# Patient Record
Sex: MALE | Race: WHITE | NOT HISPANIC OR LATINO | Employment: STUDENT | URBAN - METROPOLITAN AREA
[De-identification: names, ages, dates, MRNs, and addresses within clinical notes are randomized per-mention and may not be internally consistent; named-entity substitution may affect disease eponyms.]

---
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
## 2025-01-09 ENCOUNTER — APPOINTMENT (OUTPATIENT)
Dept: PRIMARY CARE | Facility: CLINIC | Age: 20
End: 2025-01-09
Payer: COMMERCIAL

## 2025-01-09 VITALS
DIASTOLIC BLOOD PRESSURE: 76 MMHG | TEMPERATURE: 97.2 F | HEIGHT: 67 IN | WEIGHT: 113 LBS | BODY MASS INDEX: 17.74 KG/M2 | SYSTOLIC BLOOD PRESSURE: 100 MMHG | HEART RATE: 82 BPM | OXYGEN SATURATION: 99 % | RESPIRATION RATE: 14 BRPM

## 2025-01-09 DIAGNOSIS — F90.2 ADHD (ATTENTION DEFICIT HYPERACTIVITY DISORDER), COMBINED TYPE: ICD-10-CM

## 2025-01-09 DIAGNOSIS — Z00.00 ENCOUNTER FOR GENERAL ADULT MEDICAL EXAMINATION WITHOUT ABNORMAL FINDINGS: ICD-10-CM

## 2025-01-09 DIAGNOSIS — L21.9 SEBORRHEIC DERMATITIS: ICD-10-CM

## 2025-01-09 DIAGNOSIS — J06.9 ACUTE URI: Primary | ICD-10-CM

## 2025-01-09 DIAGNOSIS — F84.5 ASPERGER'S DISORDER: ICD-10-CM

## 2025-01-09 DIAGNOSIS — L70.9 ACNE, UNSPECIFIED ACNE TYPE: ICD-10-CM

## 2025-01-09 LAB
FLUAV RNA RESP QL NAA+PROBE: NOT DETECTED
FLUBV RNA RESP QL NAA+PROBE: NOT DETECTED
POC RAPID STREP: NEGATIVE
RSV RNA RESP QL NAA+PROBE: NOT DETECTED
SARS-COV-2 RNA RESP QL NAA+PROBE: NOT DETECTED

## 2025-01-09 PROCEDURE — 87637 SARSCOV2&INF A&B&RSV AMP PRB: CPT

## 2025-01-09 PROCEDURE — 1036F TOBACCO NON-USER: CPT | Performed by: STUDENT IN AN ORGANIZED HEALTH CARE EDUCATION/TRAINING PROGRAM

## 2025-01-09 PROCEDURE — 99395 PREV VISIT EST AGE 18-39: CPT | Performed by: STUDENT IN AN ORGANIZED HEALTH CARE EDUCATION/TRAINING PROGRAM

## 2025-01-09 PROCEDURE — 87880 STREP A ASSAY W/OPTIC: CPT | Performed by: STUDENT IN AN ORGANIZED HEALTH CARE EDUCATION/TRAINING PROGRAM

## 2025-01-09 PROCEDURE — 3008F BODY MASS INDEX DOCD: CPT | Performed by: STUDENT IN AN ORGANIZED HEALTH CARE EDUCATION/TRAINING PROGRAM

## 2025-01-09 RX ORDER — AMOXICILLIN AND CLAVULANATE POTASSIUM 875; 125 MG/1; MG/1
875 TABLET, FILM COATED ORAL 2 TIMES DAILY
Qty: 14 TABLET | Refills: 0 | Status: SHIPPED | OUTPATIENT
Start: 2025-01-09 | End: 2025-01-16

## 2025-01-09 ASSESSMENT — ENCOUNTER SYMPTOMS
DIAPHORESIS: 0
MYALGIAS: 0
DIARRHEA: 0
DYSURIA: 0
CHILLS: 1
SHORTNESS OF BREATH: 0
RHINORRHEA: 0
LIGHT-HEADEDNESS: 0
SORE THROAT: 1
WHEEZING: 0
SINUS PRESSURE: 0
SINUS PAIN: 0
DIZZINESS: 0
FEVER: 0
VOMITING: 0
NAUSEA: 0
COUGH: 1

## 2025-01-09 ASSESSMENT — PATIENT HEALTH QUESTIONNAIRE - PHQ9
2. FEELING DOWN, DEPRESSED OR HOPELESS: NOT AT ALL
SUM OF ALL RESPONSES TO PHQ9 QUESTIONS 1 AND 2: 0
1. LITTLE INTEREST OR PLEASURE IN DOING THINGS: NOT AT ALL

## 2025-01-09 NOTE — PROGRESS NOTES
"Subjective   Patient ID: Kirk Ladd is a 19 y.o. male who presents for Annual Exam and Sinusitis (X3-4 days pt has c.o sore throat, cough, and congestion. Pts sister tested positive for strep. ).  HPI  Got sick assisted through trip to [place]. Some mild constipation initially but this resolved.     Main symptoms are sore throat, cough, nasal congestion intermittently but better as of recent. Some lethargy as well. Intermittent chills. Symptoms started about 4 days ago. Taking tylenol, dayquil. Appetite is off, feels hungry but gets early satiety. Sore throat is probably the most persistent symptom.     Review of Systems   Constitutional:  Positive for chills (intermittently, resolved). Negative for diaphoresis and fever.   HENT:  Positive for sore throat. Negative for ear discharge, ear pain, hearing loss, postnasal drip, rhinorrhea, sinus pressure and sinus pain.    Eyes:  Negative for visual disturbance.   Respiratory:  Positive for cough (dry). Negative for shortness of breath and wheezing.    Cardiovascular:  Negative for chest pain.   Gastrointestinal:  Negative for diarrhea, nausea and vomiting.   Endocrine: Negative for cold intolerance and heat intolerance.   Genitourinary:  Negative for dysuria, scrotal swelling and testicular pain.   Musculoskeletal:  Negative for myalgias.   Skin:  Negative for rash.   Neurological:  Negative for dizziness, syncope and light-headedness.       /76   Pulse 82   Temp 36.2 °C (97.2 °F)   Resp 14   Ht 1.689 m (5' 6.5\")   Wt 51.3 kg (113 lb)   SpO2 99%   BMI 17.97 kg/m²     Objective   Physical Exam  Vitals reviewed.   Constitutional:       General: He is not in acute distress.     Appearance: Normal appearance.   HENT:      Head: Normocephalic.      Right Ear: Tympanic membrane, ear canal and external ear normal. There is no impacted cerumen.      Left Ear: Tympanic membrane, ear canal and external ear normal. There is no impacted cerumen.      " Mouth/Throat:      Mouth: Mucous membranes are moist.      Pharynx: Oropharynx is clear. No oropharyngeal exudate or posterior oropharyngeal erythema.   Cardiovascular:      Rate and Rhythm: Normal rate and regular rhythm.   Pulmonary:      Effort: Pulmonary effort is normal. No respiratory distress.      Breath sounds: Normal breath sounds.   Abdominal:      General: Bowel sounds are normal. There is no distension.      Palpations: Abdomen is soft. There is no mass.      Tenderness: There is no abdominal tenderness. There is no guarding or rebound.   Musculoskeletal:         General: No deformity.      Cervical back: Neck supple. No tenderness.   Lymphadenopathy:      Cervical: No cervical adenopathy.   Skin:     Coloration: Skin is not jaundiced.   Neurological:      Mental Status: He is alert.         Assessment/Plan   Problem List Items Addressed This Visit       ADHD (attention deficit hyperactivity disorder), combined type    Asperger's disorder    Encounter for general adult medical examination without abnormal findings    Acne    Seborrheic dermatitis    Acute URI - Primary    Relevant Medications    amoxicillin-pot clavulanate (Augmentin) 875-125 mg tablet    Other Relevant Orders    Influenza A, and B PCR    Sars-CoV-2 PCR    RSV PCR    POCT Rapid Strep A manually resulted (Completed)         URI  - In office strep test negative  - Flu COVID RSV ordered  - Plenty of fluids and rest  - Salt water gargles to help with sore throat  - Consider Flonase if nasal congestion but this is not present.  - Monitor Tylenol intake with DayQuil  - If not much better or worsening symptoms start Augmentin.  Follow-up if not much better.  Let us know symptoms change or worsen significantly.    Asperger's disorder  ADHD  Gender identity  -Previously has been on Vyvanse 30 mg a day which helps  -CSA 7/11/2024  - UDS ordered 7/11/2024  -MERRILL-7 is 2, PHQ-9 is 5   - With above symptoms unsure of the direction to next take-unsure  if stimulant therapy or SSRI or other medication would be best for him.  Refer to psychiatry and psychology to establish care at .  - Fasting labs ordered  -1/25, did not see dermatology yet.     Seborrheic dermatitis  -Sees dermatology  -Management per derm  -Asymptomatic now, controlled with ketoconazole and metronidazole gel.      Acne  -On doxy, management per derm     Health Maintenance  -Prostate Cancer Screening: Age 50  -Vaccinations: Up-to-date flu vaccine, recommend COVID booster.  Up-to-date Tdap. Reports UTD meningitis vaccination.   -Lung Cancer Screening: Not indicated  -AAA Screening:Not indicated  -Colonoscopy:Age 45  -Screen for HIV/Hep C: Ordered.    CPE completed.  Advised to keep a heart healthy, low fat  diet with fruits and veggies like Mediterranean diet.  Advised on the importance of exercise and maintaining 150 minutes of exercise per week (30 minutes per day 5 days a week).  Advised on regular eye and dental visits.  Discussed age appropriate cancer screening, immunizations and recommendations given.  Discussed avoiding illicit drugs and tobacco and alcohol.   Advised to wear seat belt.       At [employer], studying women and gender studies and is sophomore. At home with stepmom, dad, brother and sister. Planning to transfer to [employer] after one more semester.      CPE 1 year  Follow-up sooner if needed.

## 2025-03-17 ENCOUNTER — APPOINTMENT (OUTPATIENT)
Dept: BEHAVIORAL HEALTH | Facility: CLINIC | Age: 20
End: 2025-03-17
Payer: COMMERCIAL

## 2025-03-17 DIAGNOSIS — F66 GENDER IDENTITY UNCERTAINTY: ICD-10-CM

## 2025-03-17 DIAGNOSIS — F32.A DEPRESSION, UNSPECIFIED DEPRESSION TYPE: ICD-10-CM

## 2025-03-17 DIAGNOSIS — F90.2 ADHD (ATTENTION DEFICIT HYPERACTIVITY DISORDER), COMBINED TYPE: ICD-10-CM

## 2025-03-17 DIAGNOSIS — F84.5 ASPERGER'S DISORDER: ICD-10-CM

## 2025-03-17 PROCEDURE — 99203 OFFICE O/P NEW LOW 30 MIN: CPT | Performed by: NURSE PRACTITIONER

## 2025-03-17 ASSESSMENT — ANXIETY QUESTIONNAIRES
7. FEELING AFRAID AS IF SOMETHING AWFUL MIGHT HAPPEN: NOT AT ALL
2. NOT BEING ABLE TO STOP OR CONTROL WORRYING: NOT AT ALL
4. TROUBLE RELAXING: NOT AT ALL
3. WORRYING TOO MUCH ABOUT DIFFERENT THINGS: NOT AT ALL
6. BECOMING EASILY ANNOYED OR IRRITABLE: NOT AT ALL
5. BEING SO RESTLESS THAT IT IS HARD TO SIT STILL: SEVERAL DAYS
GAD7 TOTAL SCORE: 2
1. FEELING NERVOUS, ANXIOUS, OR ON EDGE: SEVERAL DAYS
IF YOU CHECKED OFF ANY PROBLEMS ON THIS QUESTIONNAIRE, HOW DIFFICULT HAVE THESE PROBLEMS MADE IT FOR YOU TO DO YOUR WORK, TAKE CARE OF THINGS AT HOME, OR GET ALONG WITH OTHER PEOPLE: SOMEWHAT DIFFICULT

## 2025-03-17 ASSESSMENT — PATIENT HEALTH QUESTIONNAIRE - PHQ9
SUM OF ALL RESPONSES TO PHQ QUESTIONS 1-9: 6
10. IF YOU CHECKED OFF ANY PROBLEMS, HOW DIFFICULT HAVE THESE PROBLEMS MADE IT FOR YOU TO DO YOUR WORK, TAKE CARE OF THINGS AT HOME, OR GET ALONG WITH OTHER PEOPLE: SOMEWHAT DIFFICULT
8. MOVING OR SPEAKING SO SLOWLY THAT OTHER PEOPLE COULD HAVE NOTICED. OR THE OPPOSITE, BEING SO FIGETY OR RESTLESS THAT YOU HAVE BEEN MOVING AROUND A LOT MORE THAN USUAL: SEVERAL DAYS
6. FEELING BAD ABOUT YOURSELF - OR THAT YOU ARE A FAILURE OR HAVE LET YOURSELF OR YOUR FAMILY DOWN: NOT AT ALL
4. FEELING TIRED OR HAVING LITTLE ENERGY: NOT AT ALL
2. FEELING DOWN, DEPRESSED OR HOPELESS: SEVERAL DAYS
SUM OF ALL RESPONSES TO PHQ9 QUESTIONS 1 & 2: 2
5. POOR APPETITE OR OVEREATING: NOT AT ALL
1. LITTLE INTEREST OR PLEASURE IN DOING THINGS: SEVERAL DAYS
9. THOUGHTS THAT YOU WOULD BE BETTER OFF DEAD, OR OF HURTING YOURSELF: NOT AT ALL
7. TROUBLE CONCENTRATING ON THINGS, SUCH AS READING THE NEWSPAPER OR WATCHING TELEVISION: NEARLY EVERY DAY
3. TROUBLE FALLING OR STAYING ASLEEP: NOT AT ALL

## 2025-03-17 NOTE — PROGRESS NOTES
"Time In: 1333  Time Out: 1400    An interactive audio and video telecommunication system which permits real time communications between the patient (at the originating site) and provider (at the distant site) was utilized to provide this telehealth service.   Verbal consent was requested and obtained from Kirk Ladd on this date, 03/17/25 for a telehealth visit and the patient's location was confirmed at the time of the visit.    The patient verified his date of birth, address and phone number.     Subjective   Kirk Ladd, a 19 y.o. male, presenting to Psychiatry for evaluation and medication management     Preferred Name:  Kirk    Reason for visit:  Medication management    Chief Complaint:  \"A lot of thing.\"     Current Mood:  \"pretty good.\"     HPI  Kirk Ladd is a 19 y.o. male patient with a chief complaint of medication management presenting to outpatient treatment for a scheduled psych outpatient psychiatric evaluation.    The patient reports, \"A lot of thing.\" He reports that he wants to \"figure out me a little bit more.\" He reports \"of been questioning for about 4 years now.\" He reports that he wants to figure that out more. He reports that he was previously in therapy but is not right now. He reports that he wants to start estrogen. He reports that he wants to talk it over with his family. He reports that they are supportive. He reports that his original thought for pursuing mental health treatment to figure out what he needs to help him not fall back into depression anxiety. He acknowledges that his symptoms of depression and anxiety have been better managed well recently. He reports that he is transferring from [employer] to [employer] and is looking forward to this. He plans to live on campus. He is majoring in computer science.     The patient first started experiencing mental health symptoms around his senior year of high school. He reports that this was when he lost all " motivation. He reports that he was still passing his classes but wasn't joining activities or working as hard to impress his teachers. He reports that he is procrastinating but does get his work done. He reports that he has had a couple therapists in the past, starting a couple years ago. He reports that he has not been on any medications except Vyvanse for ADHD. He denies any prior suicide attempts, suicidal ideation, psychiatric hospitalizations, self harm, or violence.          ADULT Psychiatric Review of Symptoms:  Anxiety: MERRILL       MERRILL: excessive anxiety/worry, difficulty concentrating, restlessness, and See HPI    OCD: Denies    Panic: Denies    PTSD: Denies    ADHD: See HPI    Depression: Depressed mood, anhedonia, concentration poor, loss of interest, lack of motivation, and See HPI    Delirium: Denies    Psychosis: Denies    Ally: Denies    Safety Issues: Denies         HISTORY    Past Psychiatric History  Current diagnosis: depression, anxiety  Current therapist: denies  Outpatient treatment history: ADHD treatment in childhood, therapy in the past  Inpatient treatment history: denies  Substance abuse treatment: denies  History of suicide attempts: denies  History of self-harm: denies  History of violence: denies  Current psychiatric medications: denies  Past psychiatric medications: Vyvanse    Addiction/Substance Use  Alcohol use: denies  Nicotine use: denies  Drug use:    Opioids: denies   Cocaine: denies   Cannabis/[place]: denies   Methamphetamines: denies   Hallucinogens:  denies  Caffeine use: denies      Social/Developmental History  Occupation: , courtesy and office workat [employer]  Relationship status: never    Household members: lives between mother's house and father's house   Children: denies  Siblings: 4, 1 brother, 1 half sister, 1 step brother and 1 stepsister  Family relationships: father, brother, and sister  Stressors: work and school  Grade/school: finished high  "school, at [employer] but transferring to [employer] next semester, with major in computer science  Learning problems/IEP: ADHD  Abuse/neglect history: denies   history: denies  Legal history: denies  Employment history: part time  Interests/strengths: video games, time with family, reading  Guns in the home: denies      Family Psychiatric History  Mental Health: denies  Substance Abuse: denies  Suicide: denies       Medical History  -PCP: Zachary Caicedo, DO  -TBI/head trauma/LOC/seizure hx: denies  -Medical: denies  -Surgical: denies       Falls  History of falls: No  Have you fallen in the past 12 months: No      High Blood pressure  No      Depression Screening:   PHQ9 score: 6  Plan: Medication, Therapy, and Follow up with this writer      Anxiety Screening:  MERRILL-7 Score: 2  Plan: Medication, Therapy, and Follow up with this writer    Patient Health Questionnaire-9 Score: 6 (3/17/2025  1:46 PM)  MERRILL-7 Total Score: 2 (3/17/2025  1:48 PM)       Record Review: brief      Mental Status Exam:  General appearance: Appears state age, appropriate eye contact, adequate grooming and hygiene  Attitude: Calm, cooperative, and engaged in conversation.  Behavior: Appropriate eye contact.   Motor Activity: No psychomotor agitation or retardation. No abnormal movements, tremors or tics. No evidence of extrapyramidal symptoms or tardive dyskinesia.  Speech: Regular rate, rhythm, volume. Spontaneous, no pressured speech.  Mood: \"pretty good.\"   Affect: Appropriate, full range, mood congruent.  Thought Process: Linear, logical, and goal-directed. No loose associations or gross thought disorganization.  Thought Content: Denied current suicidal ideation or thoughts of harm to self, denied homicidal ideation or thoughts of harm to others. No delusional thinking elicited. No perseverations or obsessions identified.   Perception: Did not endorse auditory or visual hallucinations, did not appear to be responding to " hallucinatory stimuli.   Cognition: Alert, oriented x3. Preserved attention span and concentration, recent and remote memory. Adequate fund of knowledge. No deficits in language.   Insight: Good, in regards to understanding mental health condition  Judgement: Good           Review of Systems  Eyes  no discharge, no pain  Ears, Nose, Mouth, Throat  no pain, no rhinorrhea, no dysphagia  Resp  no dyspnea, no cough  GI  no nausea, vomiting, diarrhea    no urgency, no dysuria  Muskuloskeletal  no pain, no weakness  Integumentary  no rash  Endocrine   no polyurea  Hematologic  no bruising, no bleeding problems  CV  no palpitations, no pain  Pulm  no chest pain  Neuro  no weakness, no coordination problems, no dizziness  Constitutional  energy, appetite normal  Psychiatric  see psychiatric review of systems and HPI      OARRS:  LAURA Keating-CNP on 3/17/2025  1:32 PM  I have personally reviewed the OARRS report for Kirk Ladd. I have considered the risks of abuse, dependence, addiction and diversion        Vitals:  There were no vitals filed for this visit.        Current Medications  Current Outpatient Medications on File Prior to Visit   Medication Sig Dispense Refill    cetirizine (ZyrTEC) 10 mg tablet Take 1 tablet (10 mg) by mouth once daily.      doxycycline (Adoxa) 100 mg tablet Take 1 tablet (100 mg) by mouth once daily.      ketoconazole (NIZOral) 2 % shampoo Apply topically once daily.      metroNIDAZOLE (Metrogel) 1 % gel Apply topically once daily.      [DISCONTINUED] Vyvanse 30 mg capsule Take 1 capsule (30 mg) by mouth once daily in the morning. (Patient not taking: Reported on 3/17/2025) 30 capsule 0     No current facility-administered medications on file prior to visit.          MEDICAL-DECISION MAKING  Low: 2 or more self-limited or minor problems with Low risk of morbidity from additional diagnostic testing or treatment         IMPRESSION  This is a 19-year-old male who presents for  psychiatric evaluation for depression and anxiety as well as gender identity uncertainty.  The patient is endorsing mild symptoms of depression currently.  He is also reporting mild symptoms of anxiety.  However, at this time, he feels that they are manageable and that medication are not required.  He denies any current suicidal ideation and denies any history of suicidal thinking.  He endorses the desire to explore the possibility of starting estrogen.  He is not currently in therapy but is open to a referral to the behavioral health navigator for therapy options.  He is help seeking and future oriented.  He is agreeable to checking in with this writer on 5/12/2025 to evaluate symptoms and whether medications are needed.     Diagnoses  Problem List Items Addressed This Visit       ADHD (attention deficit hyperactivity disorder), combined type    Asperger's disorder    Gender identity uncertainty    Relevant Orders    Referral to Behavioral Health Navigator    Follow Up In Psychiatry     Other Visit Diagnoses       Depression, unspecified depression type        Relevant Orders    Referral to Behavioral Health Navigator    Follow Up In Psychiatry             SI/HI ASSESSMENT  -Risk Assessment: Kirk Ladd is currently a low acute risk of suicide and self-harm due to no past suicide attempt(s) and not currently endorsing thoughts of suicide. Kirk Ladd is currently a low acute risk of violence and harm to others due to no past history of violence and not currently threatening others.  -Suicidal Risk Factors: , male, and unmarried/single  -Violence Risk Factors: male  -Protective Factors: strong coping skills, sense of responsibility towards family, social support/connectedness, positive family relationships, hopefulness/future orientation, and employment  -Plan to Reduce Risk: Establish medication regimen, outpatient follow-up care, and increase coping skills .         [place] suicide severity  rating scale  Suicidal and self-injurious behavior in the past 3 months: denies    Suicidal and self-injurious behavior in lifetime: Denies    Suicidal ideation (check most severe in past month): Denies    Activating events (recent):  gender identity uncertainty     Treatment history: Previous psychiatric diagnosis and treatment, not receiving treatment, and no current medications/treatment    Other risk factors: , Male, No children, and Not     Clinical status (recent): Denies    Protective factors (recent): Identifies reasons for living, responsibility to family or others, living with family/supports, supportive social network or family, and engaged in work or school    Other protective factors: Age and Employed/in school    Describe any suicidal, self-injurious, or aggressive behavior (include dates): denies        PLAN  -No medications indicated at this time  -Follow-up with this provider on 5/12/2025  -Referral to behavioral health navigator for therapy options  -Risks/benefits/assessment of medication interventions discussed with pt; pt agreeable to plan. Will continue to monitor for symptoms management and side effects and adjust plan as needed.  -Motivational interviewing to increase coping skills/behavior regulation.  -Safety plan reviewed.  -Call  Psychiatry at [phone] or communicate through [employer] with issues .   -For [facility] residents, [facility] is a 24/7 hotline you can call for assistance at [phone]) [phone]. Please call 769 or go to your closest Emergency Room if you feel worse. This includes thoughts of hurting yourself or anyone else, or having other troubles such as hearing voices, seeing visions, or having new and scary thoughts about the people around you.      Review with patient: Treatment plan reviewed with the patient.  Medication risks/benefit reviewed with the patient      Time Spent:    Prep time: 2 minutes  Direct patient time: 27 minutes  Documentation  time: 7 minutes  Total time: 36 minutes    Silvia Anaya, APRN-CNP

## 2025-03-25 ENCOUNTER — TELEPHONE (OUTPATIENT)
Dept: OTHER | Age: 20
End: 2025-03-25
Payer: COMMERCIAL

## 2025-03-25 NOTE — TELEPHONE ENCOUNTER
Navigation services: attempted to contact patient to provide counseling resources on 3/18/25 (left VM), 3/25/25 (mailbox full).

## 2025-03-31 ENCOUNTER — TELEPHONE (OUTPATIENT)
Dept: OTHER | Age: 20
End: 2025-03-31

## 2025-05-12 ENCOUNTER — APPOINTMENT (OUTPATIENT)
Dept: BEHAVIORAL HEALTH | Facility: CLINIC | Age: 20
End: 2025-05-12
Payer: COMMERCIAL

## 2025-05-12 DIAGNOSIS — F32.A DEPRESSION, UNSPECIFIED DEPRESSION TYPE: ICD-10-CM

## 2025-05-12 DIAGNOSIS — F66 GENDER IDENTITY UNCERTAINTY: ICD-10-CM

## 2025-05-12 DIAGNOSIS — F41.9 ANXIETY: ICD-10-CM

## 2025-05-12 PROCEDURE — 99214 OFFICE O/P EST MOD 30 MIN: CPT | Performed by: NURSE PRACTITIONER

## 2025-05-12 RX ORDER — HYDROXYZINE HYDROCHLORIDE 10 MG/1
10 TABLET, FILM COATED ORAL DAILY PRN
Qty: 30 TABLET | Refills: 0 | Status: SHIPPED | OUTPATIENT
Start: 2025-05-12 | End: 2025-06-11

## 2025-05-12 NOTE — PROGRESS NOTES
"Time in: 1001  Time out: 1012    An interactive audio and video telecommunication system which permits real time communications between the patient (at the originating site) and provider (at the distant site) was utilized to provide this telehealth service.   Verbal consent was requested and obtained from Kirk Ladd on this date, 05/12/25 for a telehealth visit and the patient's location was confirmed at the time of the visit.     The patient verified date of birth, address and phone number.     Preferred Name:  Kirk    Chief Complaint  \"Things are good. A lot has changed and a lot has stayed the same.\"       History of Present Illness:  Kirk Ladd is a 19 y.o. male patient with a chief complaint of medication management presenting to outpatient treatment for a scheduled psych outpatient psychiatric follow-up.    The patient reports, \"Things are good. A lot has changed and a lot has stayed the same.\" He reports that he moved to a close part of [place] with his siblings and dad. He reports that he is done with school for the semester and will be transferring to [employer] and will live on campus. He reports that his depression symptoms are not too prominent and mainly situationally basis. He reports that his anxiety is more prominent and always feels present. He reports that the anxiety is manageable most of the time. He reports that he will \"get randomly nervous\" before events and \"I tend to overthink things.\" He denies and suicidal ideation. He reports that his sleep has been \"rougher than usual\" due to allergies. He reports that he did look in to the therapy option and \"we are still figuring things out.\"         Falls  History of falls: No  Have you fallen in the past 12 months: No        High Blood pressure  No        Depression Screening:   Manageable   Plan: Medication, Therapy, and Follow up with this writer        Anxiety Screening:  Increase in situational anxiety  Plan: Medication, " Culture Follow-up "Therapy, and Follow up with this writer      Record Review: brief      Mental Status Exam:  General appearance: Appears state age, appropriate eye contact, adequate grooming and hygiene  Attitude: Calm, cooperative, and engaged in conversation.  Behavior: Appropriate eye contact.   Motor Activity: No psychomotor agitation or retardation. No abnormal movements, tremors or tics. No evidence of extrapyramidal symptoms or tardive dyskinesia.  Speech: Regular rate, rhythm, volume. Spontaneous, no pressured speech.  Mood: \"Good.\"   Affect: Appropriate, full range, mood congruent.  Thought Process: Linear, logical, and goal-directed. No loose associations or gross thought disorganization.  Thought Content: Denied current suicidal ideation or thoughts of harm to self, denied homicidal ideation or thoughts of harm to others. No delusional thinking elicited. No perseverations or obsessions identified.   Perception: Did not endorse auditory or visual hallucinations, did not appear to be responding to hallucinatory stimuli.   Cognition: Alert, oriented x3. Preserved attention span and concentration, recent and remote memory. Adequate fund of knowledge. No deficits in language.   Insight: Good, in regards to understanding mental health condition  Judgement: Good        Review of Systems  Eyes  no discharge, no pain  Ears, Nose, Mouth, Throat  no pain, no rhinorrhea, no dysphagia  Resp  no dyspnea, no cough  GI  no nausea, vomiting, diarrhea    no urgency, no dysuria  Muskuloskeletal  no pain, no weakness  Integumentary  no rash  Endocrine   no polyurea  Hematologic  no bruising, no bleeding problems  CV  no palpitations, no pain  Pulm  no chest pain  Neuro  no weakness, no coordination problems, no dizziness  Constitutional  energy, appetite normal  Psychiatric  see psychiatric review of systems and HPI        Vitals:  There were no vitals filed for this visit.        OARRS:  Silvia Moss, LAURA-KATHY on 5/12/2025 10:00 " AM  I have personally reviewed the OARRS report for Kirk Ladd. I have considered the risks of abuse, dependence, addiction and diversion          Current Medications  Medications Ordered Prior to Encounter[1]      MEDICAL-DECISION MAKING  Moderate: 1 or more chronic illnesses with exacerbation, progression, or side effects of treatment with Prescription drug management          IMPRESSION  This is a 19-year-old male who presents for follow-up for depression and anxiety as well as gender identity uncertainty. The patient feels that his depressive symptoms are usually situationally based and are manageable at this time.  He reports increased situational anxiety that is sometimes more difficult to manage.  He is interested in utilizing an as needed anxiety medication to manage his anxiety and the situations.  He is agreeable to trialing as needed hydroxyzine. He denies any current suicidal ideation and denies any history of suicidal thinking.  He did receive options for individual therapy and is looking into this. He is help seeking and future oriented. He is agreeable to following up with this writer on 6/9/2025.      Diagnoses and all orders for this visit:  Gender identity uncertainty  -     Follow Up In Psychiatry  Depression, unspecified depression type  -     Follow Up In Psychiatry         Diagnoses  Problem List Items Addressed This Visit       Gender identity uncertainty     Other Visit Diagnoses         Depression, unspecified depression type                    Risk Assessment  Acute risk for suicide: Low  Chronic risk for suicide: Low      SI/HI ASSESSMENT  -Risk Assessment: Kirk Ladd is currently a low acute risk of suicide and self-harm due to no past suicide attempt(s) and not currently endorsing thoughts of suicide. Kikr Ladd is currently a low acute risk of violence and harm to others due to no past history of violence and not currently threatening others.  -Suicidal Risk  Factors: , male, and unmarried/single  -Violence Risk Factors: male  -Protective Factors: strong coping skills, sense of responsibility towards family, social support/connectedness, positive family relationships, hopefulness/future orientation, and employment  -Plan to Reduce Risk: Establish medication regimen, outpatient follow-up care, and increase coping skills .        [place] suicide severity rating scale  Suicidal and self-injurious behavior in the past 3 months: denies     Suicidal and self-injurious behavior in lifetime: Denies     Suicidal ideation (check most severe in past month): Denies     Activating events (recent): Increased situational anxiety     Treatment history: Previous psychiatric diagnosis and treatment, not receiving treatment, and no current medications/treatment     Other risk factors: , Male, No children, and Not      Clinical status (recent): Anxiety     Protective factors (recent): Identifies reasons for living, responsibility to family or others, living with family/supports, supportive social network or family, and engaged in work or school     Other protective factors: Age and Employed/in school     Describe any suicidal, self-injurious, or aggressive behavior (include dates): denies        PLAN  - Start hydroxyzine 10 mg by mouth daily as needed for anxiety; hydroxyzine 10 mg by mouth daily as needed for anxiety, dispense 30 with no refills  -Follow-up with this provider on 6/9/2025  - Patient looking into individual therapy options  -Risks/benefits/assessment of medication interventions discussed with pt; pt agreeable to plan. Will continue to monitor for symptoms management and side effects and adjust plan as needed.  -Motivational interviewing to increase coping skills/behavior regulation.  -Safety plan reviewed.  -Call  Psychiatry at [phone] or communicate through [employer] with issues .   -For [facility] residents, [facility] is a 24/7  hotline you can call for assistance at [phone]) [phone]. Please call 911 or go to your closest Emergency Room if you feel worse. This includes thoughts of hurting yourself or anyone else, or having other troubles such as hearing voices, seeing visions, or having new and scary thoughts about the people around you.      Review with patient: Treatment plan reviewed with the patient.  Medication risks/benefit reviewed with the patient      Time Spent:    Prep time: 2 minutes  Direct patient time: 11 minutes  Documentation time: 5 minutes  Total time: 18 minutes    LAURA Zhou-CNP         [1]   Current Outpatient Medications on File Prior to Visit   Medication Sig Dispense Refill    cetirizine (ZyrTEC) 10 mg tablet Take 1 tablet (10 mg) by mouth once daily.      doxycycline (Adoxa) 100 mg tablet Take 1 tablet (100 mg) by mouth once daily.      ketoconazole (NIZOral) 2 % shampoo Apply topically once daily.      metroNIDAZOLE (Metrogel) 1 % gel Apply topically once daily.       No current facility-administered medications on file prior to visit.

## 2025-06-09 ENCOUNTER — APPOINTMENT (OUTPATIENT)
Dept: BEHAVIORAL HEALTH | Facility: CLINIC | Age: 20
End: 2025-06-09
Payer: COMMERCIAL

## 2025-06-09 DIAGNOSIS — F32.0 CURRENT MILD EPISODE OF MAJOR DEPRESSIVE DISORDER WITHOUT PRIOR EPISODE: ICD-10-CM

## 2025-06-09 DIAGNOSIS — F41.9 ANXIETY: ICD-10-CM

## 2025-06-09 PROCEDURE — 99214 OFFICE O/P EST MOD 30 MIN: CPT | Performed by: NURSE PRACTITIONER

## 2025-06-09 RX ORDER — ESCITALOPRAM OXALATE 5 MG/1
5 TABLET ORAL DAILY
Qty: 30 TABLET | Refills: 0 | Status: SHIPPED | OUTPATIENT
Start: 2025-06-09 | End: 2025-07-09

## 2025-06-09 NOTE — PROGRESS NOTES
"Time in: 1233  Time out: 1246    An interactive audio and video telecommunication system which permits real time communications between the patient (at the originating site) and provider (at the distant site) was utilized to provide this telehealth service.   Verbal consent was requested and obtained from Kirk Ladd on this date, 06/09/25 for a telehealth visit and the patient's location was confirmed at the time of the visit.     The patient verified date of birth, address and phone number.     Preferred Name:  Kirk    Chief Complaint   \"Things are good.\"       History of Present Illness:  Kirk Ladd is a 20 y.o. male patient with a chief complaint of medication management presenting to outpatient treatment for a scheduled psych outpatient psychiatric follow-up.    The patient reports, \"Things are good.\" He reports that he is enjoying his summer break. He reports that he changed his work schedule so he doesn't have to work the weekends. He reports that his mood has been \"fluctuating.\" He reports that he tends to \"brood\" on things and his lack of motivation to get things done. He reports that he is not \"effectively scheduling things\" and will have conflicts with work. He reports that he tried the hydroxyzine once and he thinks it helped. He reports that he has been able to manage his anxiety pretty well recently. He denies any suicidal ideation recently. He reports that he has been sleeping well recently. He has not found a therapist yet. He reports that he is in the process of getting his 's license. He reports that he may be interested in trying medication for depression. He endorses depressed mood, lack of motivation, low energy, low appetite.  Options of medications were discussed and the patient is agreeable to starting Lexapro 5 mg daily for depression and anxiety.      Falls  History of falls: No  Have you fallen in the past 12 months: No        High Blood pressure  No        Depression " "Screening:   Manageable   Plan: Medication, Therapy, and Follow up with this writer        Anxiety Screening:  manageable  Plan: Medication, Therapy, and Follow up with this writer      Tobacco Cessation:  Do you use tobacco products: No  Do you want tobacco cessation treatment: No      Record Review: brief      Mental Status Exam:  General appearance: Appears state age, appropriate eye contact, adequate grooming and hygiene  Attitude: Calm, cooperative, and engaged in conversation.  Behavior: Appropriate eye contact.   Motor Activity: No psychomotor agitation or retardation. No abnormal movements, tremors or tics. No evidence of extrapyramidal symptoms or tardive dyskinesia.  Speech: Regular rate, rhythm, volume. Spontaneous, no pressured speech.  Mood: \"Good.\"   Affect: Appropriate, full range, mood congruent.  Thought Process: Linear, logical, and goal-directed. No loose associations or gross thought disorganization.  Thought Content: Denied current suicidal ideation or thoughts of harm to self, denied homicidal ideation or thoughts of harm to others. No delusional thinking elicited. No perseverations or obsessions identified.   Perception: Did not endorse auditory or visual hallucinations, did not appear to be responding to hallucinatory stimuli.   Cognition: Alert, oriented x3. Preserved attention span and concentration, recent and remote memory. Adequate fund of knowledge. No deficits in language.   Insight: Good, in regards to understanding mental health condition  Judgement: Good        Review of Systems  Eyes  no discharge, no pain  Ears, Nose, Mouth, Throat  no pain, no rhinorrhea, no dysphagia  Resp  no dyspnea, no cough  GI  no nausea, vomiting, diarrhea    no urgency, no dysuria  Muskuloskeletal  no pain, no weakness  Integumentary  no rash  Endocrine   no polyurea  Hematologic  no bruising, no bleeding problems  CV  no palpitations, no pain  Pulm  no chest pain  Neuro  no weakness, no coordination " problems, no dizziness  Constitutional  energy, appetite normal  Psychiatric  see psychiatric review of systems and HPI        Vitals:  There were no vitals filed for this visit.        OARRS:  LAURA Keating-CNP on 6/9/2025 12:32 PM  I have personally reviewed the OARRS report for Kirk Ladd. I have considered the risks of abuse, dependence, addiction and diversion          Current Medications  Medications Ordered Prior to Encounter[1]      MEDICAL-DECISION MAKING  Moderate: 1 or more chronic illnesses with exacerbation, progression, or side effects of treatment with Prescription drug management          IMPRESSION  This is a 20-year-old male who presents for follow-up for depression and anxiety as well as gender identity uncertainty.  The patient notices ongoing fluctuation of mood with feeling down/depressed, lack of motivation, low energy, poor appetite.  He endorses symptoms that correlate with a mild episode of depression.  He utilized as needed hydroxyzine once for anxiety and found it beneficial.  He is interested in trialing Lexapro 5 mg daily for depression and anxiety.  Risks, benefits, side effects were discussed.  He denies any current suicidal ideation and denies any history of suicidal thinking.  He did receive options for individual therapy and is looking into this. He is help seeking and future oriented. He is agreeable to following up with this writer on 7/8/2025         Diagnoses and all orders for this visit:  Current mild episode of major depressive disorder without prior episode  -     Follow Up In Psychiatry  -     escitalopram (Lexapro) 5 mg tablet; Take 1 tablet (5 mg) by mouth once daily.  -     Follow Up In Psychiatry; Future  Anxiety  -     Follow Up In Psychiatry  -     escitalopram (Lexapro) 5 mg tablet; Take 1 tablet (5 mg) by mouth once daily.  -     Follow Up In Psychiatry; Future         Diagnoses  Problem List Items Addressed This Visit    None  Visit Diagnoses          Current mild episode of major depressive disorder without prior episode        Relevant Medications    escitalopram (Lexapro) 5 mg tablet    Other Relevant Orders    Follow Up In Psychiatry      Anxiety        Relevant Medications    escitalopram (Lexapro) 5 mg tablet    Other Relevant Orders    Follow Up In Psychiatry                Risk Assessment  Acute risk for suicide: Low  Chronic risk for suicide: Low      SI/HI ASSESSMENT  -Risk Assessment: Kirk Ladd is currently a low acute risk of suicide and self-harm due to no past suicide attempt(s) and not currently endorsing thoughts of suicide. Kirk Ladd is currently a low acute risk of violence and harm to others due to no past history of violence and not currently threatening others.  -Suicidal Risk Factors: , male, and unmarried/single  -Violence Risk Factors: male  -Protective Factors: strong coping skills, sense of responsibility towards family, social support/connectedness, positive family relationships, hopefulness/future orientation, and employment  -Plan to Reduce Risk: Establish medication regimen, outpatient follow-up care, and increase coping skills .        [place] suicide severity rating scale  Suicidal and self-injurious behavior in the past 3 months: denies     Suicidal and self-injurious behavior in lifetime: Denies     Suicidal ideation (check most severe in past month): Denies     Activating events (recent): Mood fluctuation, getting his 's license     Treatment history: Previous psychiatric diagnosis and treatment, not receiving treatment, and no current medications/treatment     Other risk factors: , Male, No children, and Not      Clinical status (recent): Depression symptoms, anxiety     Protective factors (recent): Identifies reasons for living, responsibility to family or others, living with family/supports, supportive social network or family, and engaged in work or school     Other  protective factors: Age and Employed/in school     Describe any suicidal, self-injurious, or aggressive behavior (include dates): denies        PLAN  -Start Lexapro 5 mg by mouth daily for depression and anxiety; prescription sent for Lexapro 5 mg by mouth daily, dispensed 30 with no refills  - Continue hydroxyzine 10 mg by mouth daily as needed for anxiety; hydroxyzine 10 mg by mouth daily as needed for anxiety, dispense 30 with no refills  -Follow-up with this provider on 7/8/2025  - Patient looking into individual therapy options  -Risks/benefits/assessment of medication interventions discussed with pt; pt agreeable to plan. Will continue to monitor for symptoms management and side effects and adjust plan as needed.  -Motivational interviewing to increase coping skills/behavior regulation.  -Safety plan reviewed.  -Call  Psychiatry at [phone] or communicate through [facility] with issues .   -For [facility] residents, [employer] is a 24/7 hotline you can call for assistance at [phone]. Please call 351 or go to your closest Emergency Room if you feel worse. This includes thoughts of hurting yourself or anyone else, or having other troubles such as hearing voices, seeing visions, or having new and scary thoughts about the people around you.      Review with patient: Treatment plan reviewed with the patient.  Medication risks/benefit reviewed with the patient      Time Spent:    Prep time: 2 minutes  Direct patient time: 13 minutes  Documentation time: 4 minutes  Total time: 19 minutes    LAURA Zhou-KATHY         [1]   Current Outpatient Medications on File Prior to Visit   Medication Sig Dispense Refill    cetirizine (ZyrTEC) 10 mg tablet Take 1 tablet (10 mg) by mouth once daily.      doxycycline (Adoxa) 100 mg tablet Take 1 tablet (100 mg) by mouth once daily.      hydrOXYzine HCL (Atarax) 10 mg tablet Take 1 tablet (10 mg) by mouth once daily as needed for anxiety. 30 tablet 0     ketoconazole (NIZOral) 2 % shampoo Apply topically once daily.      metroNIDAZOLE (Metrogel) 1 % gel Apply topically once daily.       No current facility-administered medications on file prior to visit.

## 2025-07-08 ENCOUNTER — APPOINTMENT (OUTPATIENT)
Dept: BEHAVIORAL HEALTH | Facility: CLINIC | Age: 20
End: 2025-07-08
Payer: COMMERCIAL

## 2025-07-08 DIAGNOSIS — F32.0 CURRENT MILD EPISODE OF MAJOR DEPRESSIVE DISORDER WITHOUT PRIOR EPISODE: ICD-10-CM

## 2025-07-08 DIAGNOSIS — F41.9 ANXIETY: ICD-10-CM

## 2025-07-08 PROCEDURE — 1036F TOBACCO NON-USER: CPT | Performed by: NURSE PRACTITIONER

## 2025-07-08 RX ORDER — HYDROXYZINE HYDROCHLORIDE 10 MG/1
10 TABLET, FILM COATED ORAL DAILY PRN
Qty: 30 TABLET | Refills: 2 | Status: SHIPPED | OUTPATIENT
Start: 2025-07-08 | End: 2025-07-08 | Stop reason: ENTERED-IN-ERROR

## 2025-07-08 RX ORDER — ESCITALOPRAM OXALATE 5 MG/1
5 TABLET ORAL DAILY
Qty: 90 TABLET | Refills: 3 | Status: SHIPPED | OUTPATIENT
Start: 2025-07-08 | End: 2026-07-08

## 2025-07-08 RX ORDER — ESCITALOPRAM OXALATE 5 MG/1
5 TABLET ORAL DAILY
Qty: 90 TABLET | Refills: 3 | Status: SHIPPED | OUTPATIENT
Start: 2025-07-08 | End: 2025-07-08 | Stop reason: ENTERED-IN-ERROR

## 2025-07-08 RX ORDER — HYDROXYZINE HYDROCHLORIDE 10 MG/1
10 TABLET, FILM COATED ORAL DAILY PRN
Qty: 30 TABLET | Refills: 2 | Status: SHIPPED | OUTPATIENT
Start: 2025-07-08 | End: 2025-10-06

## 2025-07-08 NOTE — PROGRESS NOTES
The patient is currently in [place] and is unable to complete this appointment due to not being in the state of [place] he reports that he is doing well recently.. The patient confirms that that he is taking of the Lexapro and tolerating it well.  He denies any recent suicidal ideation.  He is agreeable to scheduling a follow-up appointment for 8/26/2025.  He does need refills on hydroxyzine and Lexapro which will be sent at this time.  He will reach out sooner if he needs anything prior to his next appointment.

## 2025-08-26 ENCOUNTER — APPOINTMENT (OUTPATIENT)
Dept: BEHAVIORAL HEALTH | Facility: CLINIC | Age: 20
End: 2025-08-26
Payer: COMMERCIAL

## 2026-01-12 ENCOUNTER — APPOINTMENT (OUTPATIENT)
Dept: PRIMARY CARE | Facility: CLINIC | Age: 21
End: 2026-01-12
Payer: COMMERCIAL